# Patient Record
Sex: MALE | Race: WHITE | NOT HISPANIC OR LATINO | Employment: FULL TIME | URBAN - METROPOLITAN AREA
[De-identification: names, ages, dates, MRNs, and addresses within clinical notes are randomized per-mention and may not be internally consistent; named-entity substitution may affect disease eponyms.]

---

## 2020-02-13 ENCOUNTER — OFFICE VISIT (OUTPATIENT)
Dept: URGENT CARE | Facility: CLINIC | Age: 65
End: 2020-02-13
Payer: COMMERCIAL

## 2020-02-13 VITALS
HEIGHT: 69 IN | HEART RATE: 99 BPM | BODY MASS INDEX: 34.8 KG/M2 | TEMPERATURE: 97.8 F | OXYGEN SATURATION: 96 % | DIASTOLIC BLOOD PRESSURE: 70 MMHG | WEIGHT: 235 LBS | SYSTOLIC BLOOD PRESSURE: 128 MMHG

## 2020-02-13 DIAGNOSIS — T16.2XXA FOREIGN BODY OF LEFT EAR, INITIAL ENCOUNTER: ICD-10-CM

## 2020-02-13 PROCEDURE — 69200 CLEAR OUTER EAR CANAL: CPT | Performed by: PHYSICIAN ASSISTANT

## 2020-02-13 SDOH — HEALTH STABILITY: MENTAL HEALTH: HOW OFTEN DO YOU HAVE A DRINK CONTAINING ALCOHOL?: NEVER

## 2020-02-14 NOTE — PROGRESS NOTES
"  Subjective:   Jhoan Darden is a 64 y.o. male who presents today with   Chief Complaint   Patient presents with   • Otalgia     Hearing aide piece stuck in his ear       HPI  Patient presents today for a new problem that occurred this evening.  He states he was taking his hearing aid out before bed and the end piece came off in his ear canal of the left ear.  Patient states he is unable to retrieve the ear piece.  Patient denies any other associated symptoms at this time.  PMH:  has no past medical history on file.  MEDS: No current outpatient medications on file.  ALLERGIES: No Known Allergies  SURGHX: History reviewed. No pertinent surgical history.  SOCHX:  reports that he has never smoked. He has never used smokeless tobacco. He reports previous alcohol use. He reports that he does not use drugs.  FH: Reviewed with patient, not pertinent to this visit.       Review of Systems   HENT:        Foreign body in left ear        Objective:   /70   Pulse 99   Temp 36.6 °C (97.8 °F)   Ht 1.753 m (5' 9\")   Wt 106.6 kg (235 lb)   SpO2 96%   BMI 34.70 kg/m²   Physical Exam  Constitutional:       Appearance: Normal appearance. He is normal weight.   HENT:      Ears:      Comments: Ear piece from hearing aid visualized in left ear canal.  Neurological:      Mental Status: He is alert.   Psychiatric:         Mood and Affect: Mood normal.       Alligator forceps used to retrieve foreign body.   Following foreign body removal ear canal and TM was visualized with no further complications.  Assessment/Plan:   Assessment    1. Foreign body of left ear, initial encounter  Foreign body removed without any complications.  Patient thankful for removal at this time and he tolerated it well.    Please note that this dictation was created using voice recognition software. I have made every reasonable attempt to correct obvious errors, but I expect that there are errors of grammar and possibly content that I did not discover " before finalizing the note.    Gaurav Gomez PA-C